# Patient Record
Sex: FEMALE | URBAN - METROPOLITAN AREA
[De-identification: names, ages, dates, MRNs, and addresses within clinical notes are randomized per-mention and may not be internally consistent; named-entity substitution may affect disease eponyms.]

---

## 2019-07-10 ENCOUNTER — OFFICE VISIT (OUTPATIENT)
Dept: URBAN - METROPOLITAN AREA CLINIC 32 | Facility: CLINIC | Age: 24
End: 2019-07-10
Payer: COMMERCIAL

## 2019-07-10 DIAGNOSIS — H52.13 MYOPIA, BILATERAL: Primary | ICD-10-CM

## 2019-07-10 PROCEDURE — 92004 COMPRE OPH EXAM NEW PT 1/>: CPT | Performed by: OPTOMETRIST

## 2019-07-10 PROCEDURE — 92310 CONTACT LENS FITTING OU: CPT | Performed by: OPTOMETRIST

## 2019-07-10 ASSESSMENT — VISUAL ACUITY
OD: 20/20
OS: 20/20

## 2019-07-10 ASSESSMENT — INTRAOCULAR PRESSURE
OD: 14
OS: 14

## 2019-07-10 ASSESSMENT — KERATOMETRY
OD: 43.50
OS: 43.63

## 2019-07-10 NOTE — IMPRESSION/PLAN
Impression: Myopia, bilateral: H52.13. OU. Plan: Refractive error accounts for symptoms. Release SRX. CL fit performed today, same brand with updated power. Cornea in good condition, no concern with current CL parameters, discussed good CL wear habits and hygiene, including no sleeping in lenses. Release CLRX. RTC 1 year x CEE, or sooner if any symptoms occur.